# Patient Record
Sex: MALE | URBAN - METROPOLITAN AREA
[De-identification: names, ages, dates, MRNs, and addresses within clinical notes are randomized per-mention and may not be internally consistent; named-entity substitution may affect disease eponyms.]

---

## 2024-09-04 ENCOUNTER — TELEMEDICINE (OUTPATIENT)
Dept: NEUROSURGERY | Facility: HOSPITAL | Age: 69
End: 2024-09-04
Payer: MEDICARE

## 2024-09-04 DIAGNOSIS — Z96.89 S/P DEEP BRAIN STIMULATOR PLACEMENT: ICD-10-CM

## 2024-09-04 DIAGNOSIS — G20.B2 PARKINSON'S DISEASE WITH DYSKINESIA AND FLUCTUATING MANIFESTATIONS (MULTI): Primary | ICD-10-CM

## 2024-09-04 DIAGNOSIS — Z45.42 END OF BATTERY LIFE OF DEEP BRAIN STIMULATOR: ICD-10-CM

## 2024-09-04 PROCEDURE — 99213 OFFICE O/P EST LOW 20 MIN: CPT | Mod: 95 | Performed by: NEUROLOGICAL SURGERY

## 2024-09-04 PROCEDURE — 99203 OFFICE O/P NEW LOW 30 MIN: CPT | Performed by: NEUROLOGICAL SURGERY

## 2024-09-04 NOTE — PROGRESS NOTES
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  He previously underwent implantation of bilateral STN DBS electrodes in 2021 (Paul).  This has remarkably improve his quality of life, having completely eliminated his tremor.  Unfortunately, the other facets of Parkinson's disease have continued to progress.    Recently, his battery was found to be near-depletion, and he will require replacement before October.  He has requested replacement with a rechargeable model.  Due to the urgency of impending battery depletion, we will try to arrange this surgery quickly.    The procedure was discussed in detail and all of their questions were answered.  We also discussed the risks of the procedure including, but not limited to the risks of bleeding, infection, CSF leak, hardware malfunction, and neurological injury including, but not limited to increased pain, numbness, weakness, paralysis or stroke.  The risks of an anesthetic including cardiorespiratory compromise, coma or death were touched upon, and will be discussed in greater detail by the anesthesiologist.      History of Present Illness  Chief Complaint: No chief complaint on file.        HPI: Braydon Downs is a 68 y.o. right handed male with Parkinson's Disease, referred by Dr. Joni Blandon. Patient underwent bilateral STN DBS with me on 4/15/2021 (Stage I) 4/29/2021 (Stage II). At his last office with Dr. Blandon in 6/2024, his IPG reading was 2.79v. He has balance issues and falls most days; walks with a cane. He's aslo have ongoing issues with depression and PBA. Wellbutrin in the past has caused worsened RBD. Other SSRI w/ concern for nOH, ED and other sexual side  effects. Patient presents today for DBS IPG replacement consult.       ROS: A complete 11 system ROS was performed (constitutional, eyes, ENT, cardiovascular, respiratory, GI, , musculoskeletal, skin, neurological, and psychiatric) and was negative aside from the pertinent positives and negatives noted in the HPI.      Previous History  No past medical history on file.  No past surgical history on file.     No family history on file.  Not on File  No current outpatient medications      Vitals  There were no vitals taken for this visit.

## 2024-09-04 NOTE — H&P (VIEW-ONLY)
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  He previously underwent implantation of bilateral STN DBS electrodes in 2021 (Paul).  This has remarkably improve his quality of life, having completely eliminated his tremor.  Unfortunately, the other facets of Parkinson's disease have continued to progress.    Recently, his battery was found to be near-depletion, and he will require replacement before October.  He has requested replacement with a rechargeable model.  Due to the urgency of impending battery depletion, we will try to arrange this surgery quickly.    The procedure was discussed in detail and all of their questions were answered.  We also discussed the risks of the procedure including, but not limited to the risks of bleeding, infection, CSF leak, hardware malfunction, and neurological injury including, but not limited to increased pain, numbness, weakness, paralysis or stroke.  The risks of an anesthetic including cardiorespiratory compromise, coma or death were touched upon, and will be discussed in greater detail by the anesthesiologist.      History of Present Illness  Chief Complaint: No chief complaint on file.        HPI: Braydon Downs is a 68 y.o. right handed male with Parkinson's Disease, referred by Dr. Joni Blandon. Patient underwent bilateral STN DBS with me on 4/15/2021 (Stage I) 4/29/2021 (Stage II). At his last office with Dr. Blandon in 6/2024, his IPG reading was 2.79v. He has balance issues and falls most days; walks with a cane. He's aslo have ongoing issues with depression and PBA. Wellbutrin in the past has caused worsened RBD. Other SSRI w/ concern for nOH, ED and other sexual side  effects. Patient presents today for DBS IPG replacement consult.       ROS: A complete 11 system ROS was performed (constitutional, eyes, ENT, cardiovascular, respiratory, GI, , musculoskeletal, skin, neurological, and psychiatric) and was negative aside from the pertinent positives and negatives noted in the HPI.      Previous History  No past medical history on file.  No past surgical history on file.     No family history on file.  Not on File  No current outpatient medications      Vitals  There were no vitals taken for this visit.

## 2024-09-10 ENCOUNTER — TELEPHONE (OUTPATIENT)
Dept: NEUROSURGERY | Facility: HOSPITAL | Age: 69
End: 2024-09-10
Payer: MEDICARE

## 2024-09-10 ENCOUNTER — PREP FOR PROCEDURE (OUTPATIENT)
Dept: NEUROSURGERY | Facility: HOSPITAL | Age: 69
End: 2024-09-10
Payer: MEDICARE

## 2024-09-10 DIAGNOSIS — G20.B2 PARKINSON'S DISEASE WITH DYSKINESIA AND FLUCTUATING MANIFESTATIONS (MULTI): Primary | ICD-10-CM

## 2024-09-10 DIAGNOSIS — Z96.89 S/P DEEP BRAIN STIMULATOR PLACEMENT: ICD-10-CM

## 2024-09-10 DIAGNOSIS — Z45.42 END OF BATTERY LIFE OF DEEP BRAIN STIMULATOR: ICD-10-CM

## 2024-09-10 PROBLEM — K11.7 SIALORRHEA: Status: ACTIVE | Noted: 2024-09-10

## 2024-09-10 PROBLEM — M75.101 ROTATOR CUFF SYNDROME OF RIGHT SHOULDER: Status: ACTIVE | Noted: 2022-03-28

## 2024-09-10 PROBLEM — J00 ACUTE NASOPHARYNGITIS: Status: ACTIVE | Noted: 2022-04-18

## 2024-09-10 PROBLEM — M12.811 RIGHT ROTATOR CUFF TEAR ARTHROPATHY: Status: ACTIVE | Noted: 2024-09-10

## 2024-09-10 PROBLEM — S46.011A TRAUMATIC COMPLETE TEAR OF RIGHT ROTATOR CUFF: Status: ACTIVE | Noted: 2024-05-10

## 2024-09-10 PROBLEM — D12.6 TUBULAR ADENOMA OF COLON: Status: ACTIVE | Noted: 2021-04-02

## 2024-09-10 PROBLEM — R47.1 DYSARTHRIA: Status: ACTIVE | Noted: 2021-09-28

## 2024-09-10 PROBLEM — R53.83 FATIGUE: Status: ACTIVE | Noted: 2021-09-28

## 2024-09-10 PROBLEM — S01.81XA FOREHEAD LACERATION: Status: ACTIVE | Noted: 2022-04-18

## 2024-09-10 PROBLEM — R97.20 HIGH PROSTATE SPECIFIC ANTIGEN (PSA): Status: ACTIVE | Noted: 2019-10-03

## 2024-09-10 PROBLEM — G20.A1 PARKINSON DISEASE, SYMPTOMATIC (MULTI): Status: ACTIVE | Noted: 2018-01-04

## 2024-09-10 PROBLEM — R79.89 LOW SERUM VITAMIN D: Status: ACTIVE | Noted: 2021-06-07

## 2024-09-10 PROBLEM — R13.12 OROPHARYNGEAL DYSPHAGIA: Status: ACTIVE | Noted: 2020-01-20

## 2024-09-10 PROBLEM — K21.9 GERD (GASTROESOPHAGEAL REFLUX DISEASE): Status: ACTIVE | Noted: 2024-09-10

## 2024-09-10 PROBLEM — R26.89 BALANCE PROBLEM: Status: ACTIVE | Noted: 2019-10-03

## 2024-09-10 PROBLEM — M75.101 RIGHT ROTATOR CUFF TEAR ARTHROPATHY: Status: ACTIVE | Noted: 2024-09-10

## 2024-09-10 PROBLEM — G24.01 LEVODOPA-INDUCED DYSKINESIA: Status: ACTIVE | Noted: 2018-01-04

## 2024-09-10 PROBLEM — K11.7 DROOLING: Status: ACTIVE | Noted: 2021-09-28

## 2024-09-10 PROBLEM — R45.89 DEPRESSED MOOD: Status: ACTIVE | Noted: 2022-12-21

## 2024-09-10 PROBLEM — R43.0 ANOSMIA: Status: ACTIVE | Noted: 2024-09-10

## 2024-09-10 PROBLEM — T42.8X5A LEVODOPA-INDUCED DYSKINESIA: Status: ACTIVE | Noted: 2018-01-04

## 2024-09-10 PROBLEM — R29.6 FALLS FREQUENTLY: Status: ACTIVE | Noted: 2022-03-28

## 2024-09-10 PROBLEM — M50.123 CERVICAL DISC DISORDER AT C6-C7 LEVEL WITH RADICULOPATHY: Status: ACTIVE | Noted: 2019-06-04

## 2024-09-10 PROBLEM — B35.1 ONYCHOMYCOSIS: Status: ACTIVE | Noted: 2021-06-07

## 2024-09-10 PROBLEM — R06.6 HICCUPS: Status: ACTIVE | Noted: 2021-06-09

## 2024-09-10 PROBLEM — J33.9 NASAL POLYP: Status: ACTIVE | Noted: 2022-04-18

## 2024-09-10 PROBLEM — I95.1 ORTHOSTATIC HYPOTENSION: Status: ACTIVE | Noted: 2020-12-10

## 2024-09-10 PROBLEM — N52.9 ERECTILE DYSFUNCTION: Status: ACTIVE | Noted: 2019-06-26

## 2024-09-10 PROBLEM — G20.A1 PARKINSON DISEASE (MULTI): Status: ACTIVE | Noted: 2021-04-15

## 2024-09-10 PROBLEM — C61 ADENOCARCINOMA OF PROSTATE (MULTI): Status: ACTIVE | Noted: 2020-11-30

## 2024-09-10 PROBLEM — M79.651 THIGH PAIN, MUSCULOSKELETAL, RIGHT: Status: ACTIVE | Noted: 2022-11-04

## 2024-09-10 RX ORDER — CEFAZOLIN SODIUM 2 G/100ML
2 INJECTION, SOLUTION INTRAVENOUS ONCE
OUTPATIENT
Start: 2024-09-10 | End: 2024-09-10

## 2024-09-10 NOTE — TELEPHONE ENCOUNTER
I called and spoke to patient to  discuss surgery planning with Dr. Padgett for his DBS generator replacement on 9/26 at [facility]. Patient will have preop labs completed in [place]. Patient understands and agrees to plan.

## 2024-09-25 ENCOUNTER — ANESTHESIA EVENT (OUTPATIENT)
Dept: OPERATING ROOM | Facility: HOSPITAL | Age: 69
End: 2024-09-25
Payer: MEDICARE

## 2024-09-26 ENCOUNTER — PHARMACY VISIT (OUTPATIENT)
Dept: PHARMACY | Facility: CLINIC | Age: 69
End: 2024-09-26
Payer: COMMERCIAL

## 2024-09-26 ENCOUNTER — ANESTHESIA (OUTPATIENT)
Dept: OPERATING ROOM | Facility: HOSPITAL | Age: 69
End: 2024-09-26
Payer: MEDICARE

## 2024-09-26 ENCOUNTER — HOSPITAL ENCOUNTER (OUTPATIENT)
Facility: HOSPITAL | Age: 69
Setting detail: OUTPATIENT SURGERY
Discharge: HOME | End: 2024-09-26
Attending: NEUROLOGICAL SURGERY | Admitting: NEUROLOGICAL SURGERY
Payer: MEDICARE

## 2024-09-26 VITALS
DIASTOLIC BLOOD PRESSURE: 69 MMHG | HEIGHT: 73 IN | WEIGHT: 234.13 LBS | OXYGEN SATURATION: 99 % | BODY MASS INDEX: 31.03 KG/M2 | TEMPERATURE: 97.3 F | RESPIRATION RATE: 16 BRPM | HEART RATE: 79 BPM | SYSTOLIC BLOOD PRESSURE: 148 MMHG

## 2024-09-26 DIAGNOSIS — G20.B2 PARKINSON'S DISEASE WITH DYSKINESIA AND FLUCTUATING MANIFESTATIONS: ICD-10-CM

## 2024-09-26 DIAGNOSIS — Z96.89 S/P DEEP BRAIN STIMULATOR PLACEMENT: ICD-10-CM

## 2024-09-26 DIAGNOSIS — Z45.42 END OF BATTERY LIFE OF DEEP BRAIN STIMULATOR: ICD-10-CM

## 2024-09-26 PROCEDURE — 61886 IMPLANT NEUROSTIM ARRAYS: CPT | Performed by: NEUROLOGICAL SURGERY

## 2024-09-26 PROCEDURE — 2780000003 HC OR 278 NO HCPCS: Performed by: NEUROLOGICAL SURGERY

## 2024-09-26 PROCEDURE — 7100000010 HC PHASE TWO TIME - EACH INCREMENTAL 1 MINUTE: Performed by: NEUROLOGICAL SURGERY

## 2024-09-26 PROCEDURE — RXMED WILLOW AMBULATORY MEDICATION CHARGE

## 2024-09-26 PROCEDURE — C1820 GENERATOR NEURO RECHG BAT SY: HCPCS | Performed by: NEUROLOGICAL SURGERY

## 2024-09-26 PROCEDURE — 3600000008 HC OR TIME - EACH INCREMENTAL 1 MINUTE - PROCEDURE LEVEL THREE: Performed by: NEUROLOGICAL SURGERY

## 2024-09-26 PROCEDURE — 3700000001 HC GENERAL ANESTHESIA TIME - INITIAL BASE CHARGE: Performed by: NEUROLOGICAL SURGERY

## 2024-09-26 PROCEDURE — A61886 PR IMP STIM,CRANIAL,SUBQ,>1 ARRAY: Performed by: ANESTHESIOLOGY

## 2024-09-26 PROCEDURE — 2720000007 HC OR 272 NO HCPCS: Performed by: NEUROLOGICAL SURGERY

## 2024-09-26 PROCEDURE — 3700000002 HC GENERAL ANESTHESIA TIME - EACH INCREMENTAL 1 MINUTE: Performed by: NEUROLOGICAL SURGERY

## 2024-09-26 PROCEDURE — 2500000004 HC RX 250 GENERAL PHARMACY W/ HCPCS (ALT 636 FOR OP/ED)

## 2024-09-26 PROCEDURE — 3600000003 HC OR TIME - INITIAL BASE CHARGE - PROCEDURE LEVEL THREE: Performed by: NEUROLOGICAL SURGERY

## 2024-09-26 PROCEDURE — 2500000005 HC RX 250 GENERAL PHARMACY W/O HCPCS: Performed by: NEUROLOGICAL SURGERY

## 2024-09-26 PROCEDURE — 7100000009 HC PHASE TWO TIME - INITIAL BASE CHARGE: Performed by: NEUROLOGICAL SURGERY

## 2024-09-26 DEVICE — IMPLANTABLE PULSE GENERATOR
Type: IMPLANTABLE DEVICE | Site: CHEST | Status: FUNCTIONAL
Brand: LIBERTA RC™

## 2024-09-26 RX ORDER — DROPERIDOL 2.5 MG/ML
0.62 INJECTION, SOLUTION INTRAMUSCULAR; INTRAVENOUS ONCE AS NEEDED
Status: CANCELLED | OUTPATIENT
Start: 2024-09-26

## 2024-09-26 RX ORDER — CYANOCOBALAMIN (VITAMIN B-12) 250 MCG
250 TABLET ORAL DAILY
COMMUNITY

## 2024-09-26 RX ORDER — CEFAZOLIN 1 G/1
INJECTION, POWDER, FOR SOLUTION INTRAVENOUS AS NEEDED
Status: DISCONTINUED | OUTPATIENT
Start: 2024-09-26 | End: 2024-09-26

## 2024-09-26 RX ORDER — SODIUM CHLORIDE, SODIUM LACTATE, POTASSIUM CHLORIDE, CALCIUM CHLORIDE 600; 310; 30; 20 MG/100ML; MG/100ML; MG/100ML; MG/100ML
100 INJECTION, SOLUTION INTRAVENOUS CONTINUOUS
Status: CANCELLED | OUTPATIENT
Start: 2024-09-26

## 2024-09-26 RX ORDER — OXYCODONE HYDROCHLORIDE 5 MG/1
5 TABLET ORAL EVERY 6 HOURS PRN
Qty: 15 TABLET | Refills: 0 | Status: SHIPPED | OUTPATIENT
Start: 2024-09-26

## 2024-09-26 RX ORDER — FERROUS SULFATE, DRIED 160(50) MG
1 TABLET, EXTENDED RELEASE ORAL DAILY
COMMUNITY

## 2024-09-26 RX ORDER — OMEPRAZOLE 20 MG/1
20 TABLET, DELAYED RELEASE ORAL
COMMUNITY

## 2024-09-26 RX ORDER — OXYCODONE HYDROCHLORIDE 5 MG/1
10 TABLET ORAL EVERY 4 HOURS PRN
Status: CANCELLED | OUTPATIENT
Start: 2024-09-26

## 2024-09-26 RX ORDER — FENTANYL CITRATE 50 UG/ML
INJECTION, SOLUTION INTRAMUSCULAR; INTRAVENOUS CONTINUOUS PRN
Status: DISCONTINUED | OUTPATIENT
Start: 2024-09-26 | End: 2024-09-26

## 2024-09-26 RX ORDER — HYDROMORPHONE HYDROCHLORIDE 1 MG/ML
0.2 INJECTION, SOLUTION INTRAMUSCULAR; INTRAVENOUS; SUBCUTANEOUS EVERY 5 MIN PRN
Status: CANCELLED | OUTPATIENT
Start: 2024-09-26

## 2024-09-26 RX ORDER — LIDOCAINE HYDROCHLORIDE 10 MG/ML
0.1 INJECTION, SOLUTION INFILTRATION; PERINEURAL ONCE
Status: CANCELLED | OUTPATIENT
Start: 2024-09-26 | End: 2024-09-26

## 2024-09-26 RX ORDER — ACETAMINOPHEN 325 MG/1
650 TABLET ORAL EVERY 6 HOURS PRN
Qty: 56 TABLET | Refills: 0 | Status: SHIPPED | OUTPATIENT
Start: 2024-09-26 | End: 2024-10-03

## 2024-09-26 RX ORDER — HYDROMORPHONE HYDROCHLORIDE 1 MG/ML
0.5 INJECTION, SOLUTION INTRAMUSCULAR; INTRAVENOUS; SUBCUTANEOUS EVERY 5 MIN PRN
Status: CANCELLED | OUTPATIENT
Start: 2024-09-26

## 2024-09-26 RX ORDER — PROPOFOL 10 MG/ML
INJECTION, EMULSION INTRAVENOUS CONTINUOUS PRN
Status: DISCONTINUED | OUTPATIENT
Start: 2024-09-26 | End: 2024-09-26

## 2024-09-26 RX ORDER — OXYCODONE HYDROCHLORIDE 5 MG/1
5 TABLET ORAL EVERY 4 HOURS PRN
Status: CANCELLED | OUTPATIENT
Start: 2024-09-26

## 2024-09-26 RX ORDER — AMOXICILLIN 250 MG
1 CAPSULE ORAL 2 TIMES DAILY
Qty: 14 TABLET | Refills: 0 | Status: SHIPPED | OUTPATIENT
Start: 2024-09-26 | End: 2024-10-03

## 2024-09-26 ASSESSMENT — PAIN - FUNCTIONAL ASSESSMENT
PAIN_FUNCTIONAL_ASSESSMENT: 0-10
PAIN_FUNCTIONAL_ASSESSMENT: 0-10

## 2024-09-26 ASSESSMENT — COLUMBIA-SUICIDE SEVERITY RATING SCALE - C-SSRS
1. IN THE PAST MONTH, HAVE YOU WISHED YOU WERE DEAD OR WISHED YOU COULD GO TO SLEEP AND NOT WAKE UP?: NO
6. HAVE YOU EVER DONE ANYTHING, STARTED TO DO ANYTHING, OR PREPARED TO DO ANYTHING TO END YOUR LIFE?: NO
2. HAVE YOU ACTUALLY HAD ANY THOUGHTS OF KILLING YOURSELF?: NO

## 2024-09-26 ASSESSMENT — PAIN SCALES - GENERAL
PAINLEVEL_OUTOF10: 0 - NO PAIN
PAINLEVEL_OUTOF10: 0 - NO PAIN

## 2024-09-26 NOTE — OP NOTE
Deep Brain Stimulator Generator Replacement (Abbott rechargeable) Operative Note     Date: 2024  OR Location: [facility] OR    Name: Braydon Downs, : 1955, Age: 68 y.o., MRN: [id number], Sex: male    Diagnosis  Pre-op Diagnosis      * Parkinson's disease with dyskinesia and fluctuating manifestations [G20.B2]     * S/P deep brain stimulator placement [Z96.89]     * End of battery life of deep brain stimulator [Z45.42] Post-op Diagnosis     * Parkinson's disease with dyskinesia and fluctuating manifestations [G20.B2]     * S/P deep brain stimulator placement [Z96.89]     * End of battery life of deep brain stimulator [Z45.42]     Procedures  Replacement of left deep brain stimulator pulse generator    Surgeons      * Braydon Padgett - Primary    Resident/Fellow/Other Assistant:  Surgeons and Role:     * Shawn Choudhury MD - Resident - Assisting    Procedure Summary  Anesthesia: Monitor Anesthesia Care  ASA: III  Anesthesia Staff: Anesthesiologist: Braydon Boyce MD PhD  Anesthesia Resident: Ramos Blackwood MD  Estimated Blood Loss: 1 mL  Intra-op Medications:   Administrations occurring from 1002 to 1240 on 24:   Medication Name Total Dose   lidocaine-epinephrine PF (Xylocaine W/EPI) 1 %-1:200,000 injection 10 mL              Anesthesia Record               Intraprocedure I/O Totals       None           Specimen: No specimens collected     Staff:   Alida: Ivonne  Circulator: Juliette Wilburn Person: Cierra Wilburn Person: Jessica         Drains and/or Catheters: * None in log *    Tourniquet Times:         Implants:  Implants       Type Name Action Serial No.      Stimulator Implantable Pulse Generator Implanted [id number]              Findings: Appropriate impedances    Indications: Braydon Downs is an 68 y.o. male who is having surgery for Parkinson's disease with dyskinesia and fluctuating manifestations (Multi) [G20.B2]  S/P deep brain stimulator placement [Z96.89]  End of battery  life of deep brain stimulator [Z45.42].     The patient was seen in the preoperative area. The risks, benefits, complications, treatment options, non-operative alternatives, expected recovery and outcomes were discussed with the patient. The possibilities of reaction to medication, pulmonary aspiration, injury to surrounding structures, bleeding, recurrent infection, the need for additional procedures, failure to diagnose a condition, and creating a complication requiring transfusion or operation were discussed with the patient. The patient concurred with the proposed plan, giving informed consent.  The site of surgery was properly noted/marked if necessary per policy. The patient has been actively warmed in preoperative area. Preoperative antibiotics have been ordered and given within 1 hours of incision. Venous thrombosis prophylaxis are not indicated.    Procedure Details: Dr. Downs was seen in the preoperative holding area, where the consent was confirmed. He was transferred to the operating room. The patient remained on the operating room stretcher with all pressure points padded appropriately. The surgical field was cleaned, prepped, and draped in the usual sterile fashion. Preoperative antibiotics were administered. A perioperative time-out was undertaken, and the identity of the patient and the nature of the operation were confirmed.     The incision was infiltrated with local anesthetic and epinephrine. An incision was made in the left infraclavicular region overlying the old generator with a #10 blade followed by monopolar cautery. We encountered the IPG, which was meticulously dissected and removed.     The electrodes were then disconnected from the old IPG and inserted sequentially into the corresponding ports of the new IPG. The set screws were definitively tightened. Impedances were tested and were appropriate.     The IPG was then returned to its pocket and secured with a 2-0 silk suture. The pocket  was thoroughly irrigated with Irrisept and saline. The pocket was then closed in layers with 2-0 Polysorb sutures. The skin was closed with 4-0 Monocryl sutures in a subcuticular fashion, followed by Dermabond.     The patient was then transferred to the PACU in stable condition.    Complications:  None; patient tolerated the procedure well.    Disposition: PACU - hemodynamically stable.  Condition: stable       Attending Attestation: I was present and scrubbed for the entire procedure.    Braydon Padgett  Phone Number: [phone]

## 2024-09-26 NOTE — ANESTHESIA PREPROCEDURE EVALUATION
Patient: Braydon Downs    Procedure Information       Date/Time: 09/26/24 1002    Procedure: Deep Brain Stimulator Generator Replacement (Paul rechargeable)    Location: [facility] OR 24 / [facility] OR    Surgeons: Braydon Padgett MD            Relevant Problems   Pulmonary   (+) Obstructive sleep apnea syndrome      GI   (+) GERD (gastroesophageal reflux disease)   (+) Oropharyngeal dysphagia      /Renal   (+) Adenocarcinoma of prostate (Multi)      ID   (+) Onychomycosis       Clinical information reviewed:   Tobacco  Allergies  Meds   Med Hx  Surg Hx   Fam Hx  Soc Hx        NPO Detail:  NPO/Void Status  Carbohydrate Drink Given Prior to Surgery? : N  Date of Last Liquid: 09/25/24  Time of Last Liquid: 1800  Date of Last Solid: 09/25/24  Time of Last Solid: 1800  Last Intake Type: Clear fluids  Time of Last Void: 0846         Physical Exam    Airway  Mallampati: II  TM distance: >3 FB  Neck ROM: full     Cardiovascular   Rhythm: regular  Rate: normal     Dental - normal exam     Pulmonary   Breath sounds clear to auscultation     Abdominal            Anesthesia Plan    History of general anesthesia?: yes  History of complications of general anesthesia?: no    ASA 3     MAC     Anesthetic plan and risks discussed with patient.  Use of blood products discussed with patient who.    Plan discussed with attending and resident.    Attending Anesthesiologist Note  Above information reviewed including relevant HPI, PMHx, PSHx, anesthesia history, labs, and imaging.    Summary (from patient interview and chart review):   68 y.o. right handed male with Parkinson's Disease, underwent bilateral STN DBS on 4/15/2021 (Stage I) 4/29/2021 (Stage II). He has balance issues and falls most days; walks with a cane. He's aslo have ongoing issues with depression and PBA. Patient presents today for DBS IPG replacement.            Relevant Problems   Pulmonary   (+) Obstructive sleep apnea syndrome      GI   (+)  "GERD (gastroesophageal reflux disease)   (+) Oropharyngeal dysphagia      /Renal   (+) Adenocarcinoma of prostate (Multi)      ID   (+) Onychomycosis        Medication Documentation Review Audit       Reviewed by Dereck Guzmán RN (Registered Nurse) on 09/26/24 at 0854      Medication Order Taking? Sig Documenting Provider Last Dose Status   calcium carbonate-vitamin D3 500 mg-5 mcg (200 unit) tablet [id number] Yes Take 1 tablet by mouth once daily. Historical Provider, MD Past Week Active   cyanocobalamin (Vitamin B-12) 250 mcg tablet [id number] Yes Take 1 tablet (250 mcg) by mouth once daily. Historical Provider, MD Past Week Active   omeprazole OTC (PriLOSEC OTC) 20 mg EC tablet [id number] Yes Take 1 tablet (20 mg) by mouth once daily in the morning. Take before meals. Do not crush, chew, or split. Historical Provider, MD 9/25/2024 Active                    Visit Vitals  /88   Pulse 62   Temp 36.3 °C (97.3 °F) (Tympanic)   Resp 16   Ht 1.85 m (6' 0.84\")   Wt 106 kg (234 lb 2.1 oz)   SpO2 98%   BMI 31.03 kg/m²   Smoking Status Never   BSA 2.33 m²            9/26/2024     8:39 AM   Vitals   Systolic 163   Diastolic 88   Heart Rate 62   Temp 36.3 °C (97.3 °F)   Resp 16   Height (in) 1.85 m (6' 0.84\")   Weight (lb) 234.13   BMI 31.03 kg/m2   BSA (m2) 2.33 m2        Recent Labs:    Chemistry    No results found for: \"NA\", \"K\", \"CL\", \"CO2\", \"BUN\", \"CREATININE\", \"GLU\" No results found for: \"CALCIUM\", \"ALKPHOS\", \"AST\", \"ALT\", \"BILITOT\"       No results found for: \"WBC\", \"HGB\", \"HCT\", \"PLT\"  No results found for: \"PROTIME\", \"PTT\", \"INR\"    Electrocardiogram:  No results found for this or any previous visit (from the past 4464 hour(s)).    Echocardiogram:      Anesthesia History: no complications  Anesthesia Plan: MAC, pt requests minimal/no sedation if appropriate. Std monitors.    I discussed the anesthesia plan with the patient and/or family and reviewed the risks, benefits and alternatives. Agree to " proceed.  Braydon Boyce MD PhD

## 2024-09-26 NOTE — BRIEF OP NOTE
Date: 2024  OR Location: [facility] OR    Name: Braydon Downs, : 1955, Age: 68 y.o., MRN: [id number], Sex: male    Diagnosis  Pre-op Diagnosis      * Parkinson's disease with dyskinesia and fluctuating manifestations [G20.B2]     * S/P deep brain stimulator placement [Z96.89]     * End of battery life of deep brain stimulator [Z45.42] Post-op Diagnosis     * Parkinson's disease with dyskinesia and fluctuating manifestations [G20.B2]     * S/P deep brain stimulator placement [Z96.89]     * End of battery life of deep brain stimulator [Z45.42]     Procedures  Deep Brain Stimulator Generator Replacement (Abbott rechargeable)  39718 - PA INSJ/RPLCMT CRANIAL NEUROSTIM PULSE GENERATOR      Surgeons      * Braydon Padgett - Primary    Resident/Fellow/Other Assistant:  Surgeons and Role:     * Shawn Choudhury MD - Resident - Assisting    Procedure Summary  Anesthesia: Monitor Anesthesia Care  ASA: III  Anesthesia Staff: Anesthesiologist: Braydon Boyce MD PhD  Anesthesia Resident: Ramos Blackwood MD  Estimated Blood Loss: 3mL  Intra-op Medications:   Administrations occurring from 1002 to 1240 on 24:   Medication Name Total Dose   lidocaine-epinephrine PF (Xylocaine W/EPI) 1 %-1:200,000 injection 10 mL              Anesthesia Record               Intraprocedure I/O Totals       None           Specimen: No specimens collected     Staff:   Circulator: Ivonne  Circulator: Juliette Lewisub Person: Cierra Lewisub Person: Jessica          Findings: new battery with appropriate impedances    Complications:  None; patient tolerated the procedure well.     Disposition: PACU - hemodynamically stable.  Condition: stable  Specimens Collected: No specimens collected  Attending Attestation: I was present and scrubbed for the key portions of the procedure.    Braydon Padgett  Phone Number: [phone]

## 2024-09-26 NOTE — ANESTHESIA POSTPROCEDURE EVALUATION
Patient: Braydon Downs    Procedure Summary       Date: 09/26/24 Room / Location: [facility] OR 24 / [facility] OR    Anesthesia Start: 0944 Anesthesia Stop:     Procedure: Deep Brain Stimulator Generator Replacement (Abbott rechargeable) Diagnosis:       Parkinson's disease with dyskinesia and fluctuating manifestations      S/P deep brain stimulator placement      End of battery life of deep brain stimulator      (Parkinson's disease with dyskinesia and fluctuating manifestations (Multi) [G20.B2])      (S/P deep brain stimulator placement [Z96.89])      (End of battery life of deep brain stimulator [Z45.42])    Surgeons: Braydon Padgett MD Responsible Provider: Braydon Boyce MD PhD    Anesthesia Type: MAC ASA Status: 3            Anesthesia Type: MAC    Vitals Value Taken Time   /88 09/26/24 1029   Temp 36.5 09/26/24 1029   Pulse 70 09/26/24 1029   Resp 16 09/26/24 1029   SpO2 99 09/26/24 1029       Anesthesia Post Evaluation    Patient location during evaluation: PACU  Patient participation: complete - patient participated  Level of consciousness: awake and alert  Pain management: adequate  Airway patency: patent  Cardiovascular status: acceptable, blood pressure returned to baseline and hemodynamically stable  Respiratory status: acceptable and room air  Hydration status: acceptable  Postoperative Nausea and Vomiting: none        No notable events documented.

## (undated) DEVICE — CHARGER

## (undated) DEVICE — SUTURE, SILK, 2-0, 30 IN, SH, BLACK

## (undated) DEVICE — Device

## (undated) DEVICE — COVER, CART, 45 X 27 X 48 IN, CLEAR

## (undated) DEVICE — PATIENT CONTROLLER

## (undated) DEVICE — SPONGE, GAUZE, 12 PLY, 4 X 4, STERILE, DISP

## (undated) DEVICE — NEEDLE, HYPODERMIC, ECLIPSE, 21G 1 1/2 IN T/W

## (undated) DEVICE — SPONGE, LAP, XRAY DECT, SC+RFID, 12X12, STERILE

## (undated) DEVICE — DRESSING, NON-ADHERENT, TELFA, OUCHLESS, 3 X 8 IN, STERILE

## (undated) DEVICE — GOWN, SURGICAL, SMARTGOWN, LARGE, STERILE

## (undated) DEVICE — PAD, GROUNDING, ELECTROSURGICAL, W/9 FT CABLE, POLYHESIVE II, ADULT, LF

## (undated) DEVICE — COVER, TABLE, UHC

## (undated) DEVICE — BANDAGE, ADHESIVE, FOAM, 1/4 IN THICK, 6IN X 2 YD

## (undated) DEVICE — WOUND SYSTEM, DEBRIDEMENT & CLEANING, O.R DUOPAK

## (undated) DEVICE — SUTURE, SILK, 2-0, 18 IN, BLACK

## (undated) DEVICE — DRAPE, TIBURON, SPLIT SHEET, REINF ADH STRIP, 77X122

## (undated) DEVICE — APPLICATOR, CHLORAPREP, W/ORANGE TINT, 26ML

## (undated) DEVICE — DRAPE, SHEET, UTILITY, NON ABSORBENT, 18 X 26 IN, LF

## (undated) DEVICE — SUTURE, MONOCRYL, 4-0, 18 IN, PS2, UNDYED

## (undated) DEVICE — PATIENT MAGNET

## (undated) DEVICE — TUBING, SMOKE EVAC, 3/8 X 10 FT

## (undated) DEVICE — GOWN, SURGICAL, SMARTGOWN, XLARGE, STERILE

## (undated) DEVICE — SYRINGE, 20 CC, LUER LOCK

## (undated) DEVICE — MARKER, SKIN, RULER AND LABEL PACK, CUSTOM

## (undated) DEVICE — PATIENT MANUAL WITH VIRTUAL CLINIC

## (undated) DEVICE — MANIFOLD, 4 PORT NEPTUNE STANDARD

## (undated) DEVICE — SPONGE GAUZE, XRAY SC+RFID, 4X4 16 PLY, STERILE

## (undated) DEVICE — SPONGE, GAUZE, XRAY DECT, 16 PLY, 4 X 4, W/MASTER DMT,STERILE